# Patient Record
Sex: MALE | Race: WHITE
[De-identification: names, ages, dates, MRNs, and addresses within clinical notes are randomized per-mention and may not be internally consistent; named-entity substitution may affect disease eponyms.]

---

## 2017-04-19 ENCOUNTER — HOSPITAL ENCOUNTER (EMERGENCY)
Dept: HOSPITAL 17 - PHED | Age: 77
Discharge: HOME | End: 2017-04-19
Payer: MEDICARE

## 2017-04-19 VITALS
SYSTOLIC BLOOD PRESSURE: 136 MMHG | HEART RATE: 99 BPM | OXYGEN SATURATION: 98 % | TEMPERATURE: 98.1 F | RESPIRATION RATE: 17 BRPM | DIASTOLIC BLOOD PRESSURE: 90 MMHG

## 2017-04-19 VITALS
SYSTOLIC BLOOD PRESSURE: 132 MMHG | HEART RATE: 69 BPM | RESPIRATION RATE: 18 BRPM | OXYGEN SATURATION: 99 % | DIASTOLIC BLOOD PRESSURE: 70 MMHG

## 2017-04-19 VITALS
RESPIRATION RATE: 16 BRPM | HEART RATE: 80 BPM | DIASTOLIC BLOOD PRESSURE: 80 MMHG | SYSTOLIC BLOOD PRESSURE: 142 MMHG | OXYGEN SATURATION: 95 %

## 2017-04-19 VITALS — HEIGHT: 70.5 IN | WEIGHT: 200.62 LBS | BODY MASS INDEX: 28.4 KG/M2

## 2017-04-19 DIAGNOSIS — E78.00: ICD-10-CM

## 2017-04-19 DIAGNOSIS — Z86.69: ICD-10-CM

## 2017-04-19 DIAGNOSIS — E86.0: ICD-10-CM

## 2017-04-19 DIAGNOSIS — R19.7: Primary | ICD-10-CM

## 2017-04-19 DIAGNOSIS — Z87.19: ICD-10-CM

## 2017-04-19 DIAGNOSIS — I10: ICD-10-CM

## 2017-04-19 DIAGNOSIS — Z85.828: ICD-10-CM

## 2017-04-19 DIAGNOSIS — Z87.39: ICD-10-CM

## 2017-04-19 LAB
ANION GAP SERPL CALC-SCNC: 12 MEQ/L (ref 5–15)
BASOPHILS # BLD AUTO: 0.1 TH/MM3 (ref 0–0.2)
BASOPHILS NFR BLD: 2.2 % (ref 0–2)
BUN SERPL-MCNC: 20 MG/DL (ref 7–18)
C. DIFF EPI 027: (no result)
C. DIFF TOXIN PCR: NEGATIVE
CHLORIDE SERPL-SCNC: 113 MEQ/L (ref 98–107)
EOSINOPHIL # BLD: 0.1 TH/MM3 (ref 0–0.4)
EOSINOPHIL NFR BLD: 1.4 % (ref 0–4)
ERYTHROCYTE [DISTWIDTH] IN BLOOD BY AUTOMATED COUNT: 14.4 % (ref 11.6–17.2)
GFR SERPLBLD BASED ON 1.73 SQ M-ARVRAT: 46 ML/MIN (ref 89–?)
HCO3 BLD-SCNC: 18 MEQ/L (ref 21–32)
HCT VFR BLD CALC: 48.7 % (ref 39–51)
HEMO FLAGS: (no result)
LYMPHOCYTES # BLD AUTO: 0.4 TH/MM3 (ref 1–4.8)
LYMPHOCYTES NFR BLD AUTO: 6.9 % (ref 9–44)
MCH RBC QN AUTO: 28.5 PG (ref 27–34)
MCHC RBC AUTO-ENTMCNC: 32.1 % (ref 32–36)
MCV RBC AUTO: 88.7 FL (ref 80–100)
MONOCYTES NFR BLD: 9.1 % (ref 0–8)
NEUTROPHILS # BLD AUTO: 4.4 TH/MM3 (ref 1.8–7.7)
NEUTROPHILS NFR BLD AUTO: 80.4 % (ref 16–70)
PLATELET # BLD: 109 TH/MM3 (ref 150–450)
POTASSIUM SERPL-SCNC: 4.1 MEQ/L (ref 3.5–5.1)
RBC # BLD AUTO: 5.49 MIL/MM3 (ref 4.5–5.9)
SODIUM SERPL-SCNC: 143 MEQ/L (ref 136–145)
WBC # BLD AUTO: 5.5 TH/MM3 (ref 4–11)

## 2017-04-19 PROCEDURE — 80048 BASIC METABOLIC PNL TOTAL CA: CPT

## 2017-04-19 PROCEDURE — 85025 COMPLETE CBC W/AUTO DIFF WBC: CPT

## 2017-04-19 PROCEDURE — 99284 EMERGENCY DEPT VISIT MOD MDM: CPT

## 2017-04-19 PROCEDURE — 96361 HYDRATE IV INFUSION ADD-ON: CPT

## 2017-04-19 PROCEDURE — 87493 C DIFF AMPLIFIED PROBE: CPT

## 2017-04-19 PROCEDURE — 96360 HYDRATION IV INFUSION INIT: CPT

## 2017-04-19 NOTE — PD
HPI


Chief Complaint:  GI Complaint


Time Seen by Provider:  09:54


Travel History


International Travel<30 days:  No


Contact w/Intl Traveler<30days:  No


Traveled to known affect area:  No





History of Present Illness


HPI


76-year-old male came to the emergency room with history of diarrhea since past 

2 days.  Patient says that last night he was unable to stop going to the 

bathroom.  He was up every 10 minutes.  The stool is runny but no blood.  He 

does not have any other symptoms like abdominal pain or vomiting.  No known 

sick contacts.  No recent antibiotic use or hospitalization.





[facility]


Past Medical History


*** Narrative Medical


List of his past medical, surgical, social and family history was reviewed from 

the nursing note.


Arthritis:  Yes


Cancer:  Yes (MELANOMA FACE)


High Cholesterol:  Yes


Diabetes:  No


Diminished Hearing:  No


GERD:  Yes


Glaucoma:  Yes


Gout:  Yes


Hepatitis:  No


Hiatal Hernia:  Yes (GERD HX)


Hypertension:  Yes


Inguinal Hernia:  Yes


Thyroid Disease:  No





Past Surgical History


Abdominal Surgery:  Yes ([facility])


Cardiac Surgery:  No


Ear Surgery:  No


Endocrine Surgery:  No


Eye Surgery:  No


Genitourinary Surgery:  Yes (RENAL CALC. RETRIEVAL)


Gynecologic Surgery:  No


Oral Surgery:  Yes (T & A, EXC. FB ESOPHAGUS, WISDOM TEETH EXTR.)


Pacemaker:  No


Thoracic Surgery:  No


Other Surgery:  Yes (HERNIA REPAIR)





Social History


Alcohol Use:  Yes (2-3 DRINKS A WEEK)


Tobacco Use:  No


Substance Use:  No





Allergies-Medications


(Allergen,Severity, Reaction):  


Coded Allergies:  


     No Known Allergies (Verified , 4/21/17)


Comments


No known drug allergies.


Reported Meds & Prescriptions





Reported Meds & Active Scripts


Active


Cipro (Ciprofloxacin HCl) 500 Mg Tab 500 Mg PO BID 5 Days


Lomotil (Diphenoxylate-Atropine) 2.5-0.025 Mg Tab 2 Tab PO Q6H PRN


Reported


Aspirin 81 Mg Chew 81 Mg CHEW DAILY


Amlodipine (Amlodipine Besylate) 5 Mg Tab 5 Mg PO DAILY


Lisinopril 20 Mg Tab 20 Mg PO DAILY


Omeprazole 20 Mg Tab 20 Mg PO DAILY


Allopurinol 300 Mg Tab 300 Mg PO DAILY





Narrative Medication


List of his home medications reviewed from the nursing note.





Review of Systems


Except as stated in HPI:  all other systems reviewed are Neg





Physical Exam


Narrative


GENERAL: Awake, alert, no obvious distress


SKIN: Focused skin assessment warm/dry.


HEAD: Atraumatic. Normocephalic. 


EYES: Pupils equal and round. No scleral icterus. No injection or drainage. 


ENT: No nasal bleeding or discharge.  Dry mucous membrane and coated tongue.


NECK: Trachea midline. No JVD. 


CARDIOVASCULAR: Regular rate and rhythm.  No murmur appreciated.


RESPIRATORY: No accessory muscle use. Clear to auscultation. Breath sounds 

equal bilaterally. 


GASTROINTESTINAL: Abdomen soft, non-tender, nondistended. Hepatic and splenic 

margins not palpable. 


MUSCULOSKELETAL: No obvious deformities. No clubbing.  No cyanosis.  No edema. 


NEUROLOGICAL: Awake and alert. No obvious cranial nerve deficits.  Motor 

grossly within normal limits. Normal speech.


PSYCHIATRIC: Appropriate mood and affect; insight and judgment normal.





Data


Data


Last Documented VS





Orders





 Basic Metabolic Panel (Bmp) (4/19/17 09:59)


Complete Blood Count With Diff (4/19/17 09:59)


Iv Access Insert/Monitor (4/19/17 09:59)


Ecg Monitoring (4/19/17 09:59)


Oximetry (4/19/17 09:59)


Sodium Chlor 0.9% 1000 Ml Inj (Ns 1000 M (4/19/17 09:59)


Sodium Chloride 0.9% Flush (Ns Flush) (4/19/17 10:00)


C Diff Toxin Pcr (4/19/17 09:59)


Sodium Chlor 0.9% 1000 Ml Inj (Ns 1000 M (4/19/17 12:00)





Labs








MDM


Medical Decision Making


Medical Screen Exam Complete:  Yes


Emergency Medical Condition:  Yes


Medical Record Reviewed:  Yes


Differential Diagnosis


Diarrhea, dehydration, C. difficile


Narrative Course


10:19 AM awaiting for the blood test results.  Patient was ordered for 1 L of 

IV fluid bolus.  He did have a stool sample and I have ordered for a C. 

difficile.





12:17 PM blood test results of back and suggestive of dehydration.  I ordered a 

second liter of IV fluid which patient is currently getting.  He did not have 

anymore bowel movements since 10:30 but he also did not eat or drink anything.  

I have asked the nurse to give him some gustavo crackers and water.  I was told 

by the lab that the C. difficile will take another 4-5 hours to be resulted.  

At this point I'm comfortable discharging the patient home.  He will not be 

started on loperamide till I have the C. difficile result back.





Procedures


EKG Prior to Arrival:  No





Diagnosis





 Primary Impression:  


 Diarrhea


 Qualified Code:  R19.7 - Diarrhea, unspecified type


 Additional Impression:  


 Dehydration


Referrals:  


Primary Care Physician


2 days





***Additional Instructions:


Please return to the ER if the condition worsens or any other new concerns.  

The stool test result is pending.  We will call you if it is positive.  If it 

is not then he can start herself on Imodium over-the-counter.  Please follow-up 

with your primary care in couple days.


Disposition:  01 DISCHARGE HOME


Condition:  Stable








Benji Sloan MD Apr 19, 2017 09:54


 


Basophils (%) (Auto) 2.2 %


 


Neutrophils # (Auto) 4.4 TH/MM3


 


Lymphocytes # (Auto) 0.4 TH/MM3


 


Monocytes # (Auto) 0.5 TH/MM3


 


Eosinophils # (Auto) 0.1 TH/MM3


 


Basophils # (Auto) 0.1 TH/MM3


 


CBC Comment DIFF FINAL 


 


Differential Comment  


 


Sodium Level 143 MEQ/L


 


Potassium Level 4.1 MEQ/L


 


Chloride Level 113 MEQ/L


 


Carbon Dioxide Level 18.0 MEQ/L


 


Anion Gap 12 MEQ/L


 


Blood Urea Nitrogen 20 MG/DL


 


Creatinine 1.50 MG/DL


 


Estimat Glomerular Filtration 46 ML/MIN





Rate 


 


Random Glucose 107 MG/DL


 


Calcium Level 8.3 MG/DL














MDM


Medical Decision Making


Medical Screen Exam Complete:  Yes


Emergency Medical Condition:  Yes


Medical Record Reviewed:  Yes


Differential Diagnosis


Diarrhea, dehydration, C. difficile


Narrative Course


10:19 AM awaiting for the blood test results.  Patient was ordered for 1 L of 

IV fluid bolus.  He did have a stool sample and I have ordered for a C. 

difficile.





12:17 PM blood test results of back and suggestive of dehydration.  I ordered a 

second liter of IV fluid which patient is currently getting.  He did not have 

anymore bowel movements since 10:30 but he also did not eat or drink anything.  

I have asked the nurse to give him some gustavo crackers and water.  I was told 

by the lab that the C. difficile will take another 4-5 hours to be resulted.  

At this point I'm comfortable discharging the patient home.  He will not be 

started on loperamide till I have the C. difficile result back.





Procedures


EKG Prior to Arrival:  No





Diagnosis





 Primary Impression:  


 Diarrhea


 Qualified Code:  R19.7 - Diarrhea, unspecified type


 Additional Impression:  


 Dehydration


Referrals:  


Primary Care Physician


2 days





***Additional Instructions:


Please return to the ER if the condition worsens or any other new concerns.  

The stool test result is pending.  We will call you if it is positive.  If it 

is not then he can start herself on Imodium over-the-counter.  Please follow-up 

with your primary care in couple days.


Disposition:  01 DISCHARGE HOME


Condition:  Stable








Benji Sloan MD Apr 19, 2017 09:54

## 2017-04-21 ENCOUNTER — HOSPITAL ENCOUNTER (EMERGENCY)
Dept: HOSPITAL 17 - PHED | Age: 77
Discharge: HOME | End: 2017-04-21
Payer: MEDICARE

## 2017-04-21 VITALS
DIASTOLIC BLOOD PRESSURE: 69 MMHG | SYSTOLIC BLOOD PRESSURE: 118 MMHG | RESPIRATION RATE: 14 BRPM | OXYGEN SATURATION: 96 % | HEART RATE: 67 BPM

## 2017-04-21 VITALS
OXYGEN SATURATION: 96 % | DIASTOLIC BLOOD PRESSURE: 70 MMHG | HEART RATE: 79 BPM | SYSTOLIC BLOOD PRESSURE: 126 MMHG | RESPIRATION RATE: 16 BRPM | TEMPERATURE: 97.7 F

## 2017-04-21 VITALS — BODY MASS INDEX: 28.09 KG/M2 | HEIGHT: 71 IN | WEIGHT: 200.62 LBS

## 2017-04-21 DIAGNOSIS — Z87.39: ICD-10-CM

## 2017-04-21 DIAGNOSIS — R19.7: Primary | ICD-10-CM

## 2017-04-21 DIAGNOSIS — I10: ICD-10-CM

## 2017-04-21 LAB
ALP SERPL-CCNC: 66 U/L (ref 45–117)
ALT SERPL-CCNC: 20 U/L (ref 12–78)
ANION GAP SERPL CALC-SCNC: 11 MEQ/L (ref 5–15)
AST SERPL-CCNC: 24 U/L (ref 15–37)
BASOPHILS # BLD AUTO: 0 TH/MM3 (ref 0–0.2)
BASOPHILS NFR BLD: 0.5 % (ref 0–2)
BILIRUB SERPL-MCNC: 0.6 MG/DL (ref 0.2–1)
BUN SERPL-MCNC: 19 MG/DL (ref 7–18)
CHLORIDE SERPL-SCNC: 113 MEQ/L (ref 98–107)
EOSINOPHIL # BLD: 0.1 TH/MM3 (ref 0–0.4)
EOSINOPHIL NFR BLD: 1.6 % (ref 0–4)
ERYTHROCYTE [DISTWIDTH] IN BLOOD BY AUTOMATED COUNT: 13.9 % (ref 11.6–17.2)
GFR SERPLBLD BASED ON 1.73 SQ M-ARVRAT: 50 ML/MIN (ref 89–?)
HCO3 BLD-SCNC: 17.4 MEQ/L (ref 21–32)
HCT VFR BLD CALC: 47.2 % (ref 39–51)
HEMO FLAGS: (no result)
LYMPHOCYTES # BLD AUTO: 0.6 TH/MM3 (ref 1–4.8)
LYMPHOCYTES NFR BLD AUTO: 11.7 % (ref 9–44)
MCH RBC QN AUTO: 29 PG (ref 27–34)
MCHC RBC AUTO-ENTMCNC: 32.5 % (ref 32–36)
MCV RBC AUTO: 89 FL (ref 80–100)
MONOCYTES NFR BLD: 9.8 % (ref 0–8)
NEUTROPHILS # BLD AUTO: 4.2 TH/MM3 (ref 1.8–7.7)
NEUTROPHILS NFR BLD AUTO: 76.4 % (ref 16–70)
PLATELET # BLD: 125 TH/MM3 (ref 150–450)
POTASSIUM SERPL-SCNC: 4.2 MEQ/L (ref 3.5–5.1)
RBC # BLD AUTO: 5.3 MIL/MM3 (ref 4.5–5.9)
SODIUM SERPL-SCNC: 141 MEQ/L (ref 136–145)
WBC # BLD AUTO: 5.4 TH/MM3 (ref 4–11)

## 2017-04-21 PROCEDURE — 99284 EMERGENCY DEPT VISIT MOD MDM: CPT

## 2017-04-21 PROCEDURE — 85025 COMPLETE CBC W/AUTO DIFF WBC: CPT

## 2017-04-21 PROCEDURE — 80053 COMPREHEN METABOLIC PANEL: CPT

## 2017-04-21 PROCEDURE — 96360 HYDRATION IV INFUSION INIT: CPT

## 2017-04-21 NOTE — PD
HPI


Chief Complaint:  GI Complaint


Time Seen by Provider:  09:50


Travel History


International Travel<30 days:  No


Contact w/Intl Traveler<30days:  No


Traveled to known affect area:  No





History of Present Illness


HPI


This 76-year-old man who presents to the emergency department complaining of 

diarrhea.  He states he's been having loose watery stools, every hour or so, 

for the past 5 days.  He was on multiple times through the night with watery 

diarrhea.  No sick contacts, no recent travel, no unusual foods.  He's never 

had similar trouble before.  No blood in his stool.  Maybe a little bit immune 

this.  He was seen here a couple days ago and had a negative C. difficile and 

some labs show just some dehydration.  He has been taking loperamide but 

without significant relief.  No fevers.  No vomiting.  He drinks city water.  He

's got 18 times or so in the past 24 hours.





History


Past Medical History


*** Narrative Medical


Hypertension


Gout


Tetanus Vaccination:  > 5 Years


Influenza Vaccination:  Yes





Social History


Alcohol Use:  Yes (2-3 DRINKS A WEEK)


Tobacco Use:  No





Allergies-Medications


(Allergen,Severity, Reaction):  


Coded Allergies:  


     No Known Allergies (Verified , 4/21/17)


Reported Meds & Prescriptions





Reported Meds & Active Scripts


Active


Reported


Aspirin 81 Mg Chew 81 Mg CHEW DAILY


Amlodipine (Amlodipine Besylate) 5 Mg Tab 5 Mg PO DAILY


Lisinopril 20 Mg Tab 20 Mg PO DAILY


Omeprazole 20 Mg Tab 20 Mg PO DAILY


Allopurinol 300 Mg Tab 300 Mg PO DAILY








Review of Systems


Except as stated in HPI:  all other systems reviewed are Neg





Physical Exam


Narrative


GENERAL: Well-appearing 76 old man, no acute distress.


SKIN: Focused skin assessment warm/dry.


HEAD: Atraumatic. Normocephalic. 


EYES: Pupils equal and round. No scleral icterus. No injection or drainage. 


ENT: No nasal bleeding or discharge.  Mucous membranes pink and moist.


NECK: Trachea midline. No JVD. 


CARDIOVASCULAR: Regular rate and rhythm.  No murmur appreciated.


RESPIRATORY: No accessory muscle use. Clear to auscultation. Breath sounds 

equal bilaterally. 


GASTROINTESTINAL: Abdomen soft, non-tender, nondistended. Hepatic and splenic 

margins not palpable. 


MUSCULOSKELETAL: No obvious deformities. No clubbing.  No cyanosis.  No edema. 


NEUROLOGICAL: Awake and alert. No obvious cranial nerve deficits.  Motor 

grossly within normal limits. Normal speech.


PSYCHIATRIC: Appropriate mood and affect; insight and judgment normal.





Data


Data


Last Documented VS





Vital Signs








  Date Time  Temp Pulse Resp B/P Pulse Ox O2 Delivery O2 Flow Rate FiO2


 


4/21/17 09:22 97.7 79 16 126/70 96   








Orders





 Complete Blood Count With Diff (4/21/17 10:05)


Comprehensive Metabolic Panel (4/21/17 10:05)


Iv Access Insert/Monitor (4/21/17 10:05)


Enteric Path (Stool) (4/21/17 10:05)


Stool Ova And Parasite Screen (4/21/17 10:05)


Diphenoxylate/Atropine Tab (Lomotil Tab) (4/21/17 10:15)


Sodium Chlor 0.9% 1000 Ml Inj (Ns 1000 M (4/21/17 10:15)


Ciprofloxacin (Cipro) (4/21/17 10:15)





Labs





 Laboratory Tests








Test 4/21/17





 10:15


 


White Blood Count 5.4 TH/MM3


 


Red Blood Count 5.30 MIL/MM3


 


Hemoglobin 15.4 GM/DL


 


Hematocrit 47.2 %


 


Mean Corpuscular Volume 89.0 FL


 


Mean Corpuscular Hemoglobin 29.0 PG


 


Mean Corpuscular Hemoglobin 32.5 %





Concent 


 


Red Cell Distribution Width 13.9 %


 


Platelet Count 125 TH/MM3


 


Mean Platelet Volume 9.5 FL


 


Neutrophils (%) (Auto) 76.4 %


 


Lymphocytes (%) (Auto) 11.7 %


 


Monocytes (%) (Auto) 9.8 %


 


Eosinophils (%) (Auto) 1.6 %


 


Basophils (%) (Auto) 0.5 %


 


Neutrophils # (Auto) 4.2 TH/MM3


 


Lymphocytes # (Auto) 0.6 TH/MM3


 


Monocytes # (Auto) 0.5 TH/MM3


 


Eosinophils # (Auto) 0.1 TH/MM3


 


Basophils # (Auto) 0.0 TH/MM3


 


CBC Comment DIFF FINAL 


 


Differential Comment  


 


Sodium Level 141 MEQ/L


 


Potassium Level 4.2 MEQ/L


 


Chloride Level 113 MEQ/L


 


Carbon Dioxide Level 17.4 MEQ/L


 


Anion Gap 11 MEQ/L


 


Blood Urea Nitrogen 19 MG/DL


 


Creatinine 1.40 MG/DL


 


Estimat Glomerular Filtration 50 ML/MIN





Rate 


 


Random Glucose 104 MG/DL


 


Calcium Level 8.5 MG/DL


 


Total Bilirubin 0.6 MG/DL


 


Aspartate Amino Transf 24 U/L





(AST/SGOT) 


 


Alanine Aminotransferase 20 U/L





(ALT/SGPT) 


 


Alkaline Phosphatase 66 U/L


 


Total Protein 7.3 GM/DL


 


Albumin 3.7 GM/DL











[facility]


Medical Decision Making


Medical Screen Exam Complete:  Yes


Emergency Medical Condition:  Yes


Interpretation(s)


LABS:


CBC remarkable for platelet count 125


CMP still showing hyperchloremic acidosis.


Differential Diagnosis


Infectious enteritis, colitis, diverticulitis, parasitic infection, other


Narrative Course


Medical decision making





76 old man with watery diarrhea ongoing for about 5 days now.  We'll send stool 

culture, stool O and P.  We'll also switch to Lomotil, treated with 

antibiotics.  Recommend GI follow-up.





Diagnosis





 Primary Impression:  


 Diarrhea





***Additional Instructions:


Take Lomotil as prescribed.





Take Cipro as prescribed.





Drink plenty of fluids to stay well-hydrated.





Follow-up with your gastroenterologist at the first available appointment.





Return to the emergency department for bloody stool, high fevers, abdominal pain

, or any other new or worsening symptoms.


***Med/Other Pt SpecificInfo:  Prescription(s) given


Scripts


Ciprofloxacin (Cipro)500 Mg Qtt376 Mg PO BID  5 Days  Ref 0


   Prov:Garret Mitchell MD         4/21/17 


Diphenoxylate-Atropine (Lomotil)2.5-0.025 Mg Tab2 Tab PO Q6H PRN (DIARRHEA) #30 

TAB  Ref 0


   Prov:Garret Mitchell MD         4/21/17


Disposition:  01 DISCHARGE HOME


Condition:  Stable








Garret Mitchell MD Apr 21, 2017 11:18

## 2017-05-18 ENCOUNTER — HOSPITAL ENCOUNTER (EMERGENCY)
Dept: HOSPITAL 17 - PHEFT | Age: 77
Discharge: HOME | End: 2017-05-18
Payer: MEDICARE

## 2017-05-18 VITALS — HEIGHT: 71 IN | BODY MASS INDEX: 27.65 KG/M2 | WEIGHT: 197.53 LBS

## 2017-05-18 VITALS
HEART RATE: 91 BPM | OXYGEN SATURATION: 98 % | SYSTOLIC BLOOD PRESSURE: 155 MMHG | TEMPERATURE: 98.5 F | DIASTOLIC BLOOD PRESSURE: 85 MMHG | RESPIRATION RATE: 16 BRPM
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
## 2017-05-18 DIAGNOSIS — S61.012A: Primary | ICD-10-CM

## 2017-05-18 DIAGNOSIS — I10: ICD-10-CM

## 2017-05-18 PROCEDURE — 12001 RPR S/N/AX/GEN/TRNK 2.5CM/<: CPT

## 2017-05-18 NOTE — PD
HPI


Chief Complaint:  Laceration/Skin Injury


Time Seen by Provider:  12:42


Travel History


International Travel<30 days:  No


Contact w/Intl Traveler<30days:  No


Traveled to known affect area:  No





History of Present Illness


HPI


76-year-old male presents to the emergency room for evaluation of a laceration 

to his left thumb.  Patient was using a table saw and got his thumb too close 

to the blade.  He immediately applied pressure and came to the emergency room 

without washing it.  He takes aspirin but no other blood thinners.  Denies loss 

of sensation or significant pain.  Last tetanus was less than 10 years ago.  No 

history of diabetes.





PFSH


Past Medical History


Hx Anticoagulant Therapy:  Yes (BABY ASA DAILY)


Arthritis:  Yes


Cancer:  Yes (MELANOMA FACE)


Cardiovascular Problems:  Yes (htn on meds)


High Cholesterol:  Yes


Diabetes:  No


Diminished Hearing:  No


GERD:  Yes


Glaucoma:  Yes


Gout:  Yes


Hepatitis:  No


Hiatal Hernia:  Yes (GERD HX)


Hypertension:  Yes


Inguinal Hernia:  Yes


Thyroid Disease:  No





Past Surgical History


Abdominal Surgery:  Yes ([facility])


Cardiac Surgery:  No


Ear Surgery:  No


Endocrine Surgery:  No


Eye Surgery:  No


Genitourinary Surgery:  Yes (RENAL CALC. RETRIEVAL)


Gynecologic Surgery:  No


Oral Surgery:  Yes (T & A, EXC. FB ESOPHAGUS, WISDOM TEETH EXTR.)


Pacemaker:  No


Thoracic Surgery:  No


Other Surgery:  Yes (HERNIA REPAIR)





Social History


Alcohol Use:  Yes (2-3 DRINKS A WEEK)


Tobacco Use:  No


Substance Use:  No





Allergies-Medications


(Allergen,Severity, Reaction):  


Coded Allergies:  


     No Known Allergies (Verified , 5/18/17)


Reported Meds & Prescriptions





Reported Meds & Active Scripts


Active


Reported


Aspirin 81 Mg Chew 81 Mg CHEW DAILY


Amlodipine (Amlodipine Besylate) 5 Mg Tab 5 Mg PO DAILY


Lisinopril 20 Mg Tab 20 Mg PO DAILY


Omeprazole 20 Mg Tab 20 Mg PO DAILY


Allopurinol 300 Mg Tab 300 Mg PO DAILY








Review of Systems


Except as stated in HPI:  all other systems reviewed are Neg





Physical Exam


Narrative


GENERAL: Well-nourished, well-developed elderly male in no acute distress.  

Afebrile.  Ambulatory.


SKIN: Focused skin assessment warm/dry.  There is a 2 cm rigid, deep laceration 

of the left distal thumb.  No nail involvement.  No obvious foreign bodies.  

None bleeding.


HEAD: Normocephalic.


EYES: No scleral icterus. No injection or drainage. 


NECK: Supple, trachea midline. No JVD or lymphadenopathy.


CARDIOVASCULAR: Regular rate and rhythm without murmurs, gallops, or rubs. 


RESPIRATORY: Breath sounds equal bilaterally. No accessory muscle use.


EXTREMITY: No significant tenderness to palpation.  Less than 2 second 

capillary refill distally.  Distal sensation intact.  Full range motion of the 

left thumb.





Data


Data


Last Documented VS





Vital Signs








  Date Time  Temp Pulse Resp B/P Pulse Ox O2 Delivery O2 Flow Rate FiO2


 


5/18/17 12:41   16     


 


5/18/17 12:37 98.5 91  155/85 98   








Orders





 Bupivacaine Pf 0.5% Inj (Marcaine Pf 0.5 (5/18/17 12:45)


Lidocaine 1% Inj (50 Ml) (Xylocaine 1% I (5/18/17 12:45)








MDM


Medical Decision Making


Medical Screen Exam Complete:  Yes


Emergency Medical Condition:  Yes


Medical Record Reviewed:  Yes


Differential Diagnosis


Laceration versus abrasion versus contusion versus fracture


Narrative Course


76-year-old male presents to the emergency room for evaluation of left distal 

thumb laceration that occurred just prior to arrival after patient cut his 

finger on a table fall.   Physical exam reveals a 2 cm rigid laceration to the 

distal thumb without nail involvement.  Sensation intact with less than 2 

second capillary refill distally.  The laceration was repaired, see procedure 

note for details.  Patient discharged with prescription for Keflex and wound 

care instructions.  Told to follow up with primary care physician or return for 

worsening symptoms.  He understands and agrees to plan.





Procedures


**Procedure Narrative**


LACERATION


LOCATION: Left distal thumb


LENGTH: 2 cm


NUMBER OF STITCHES/STAPLES: 7 simple interrupted





REPAIR: The area of the laceration was prepped with Betadine and sterilely 

draped.  Digital block performed using 1% lidocaine and 0.5% bupivacaine.  The 

wound was copiously irrigated and explored without evidence of foreign body, 

tendon injury or neurovascular injury.  The wound was closed using 5-0 Prolene. 

This was a single layer repair. A sterile dressing was applied. The patient was 

advised to keep the dressing clean and dry. Patient tolerated the procedure 

well.





Diagnosis





 Primary Impression:  


 Laceration of left thumb


 Qualified Code:  S61.012A - Laceration of left thumb, initial encounter


Referrals:  


Pediatrician


Patient Instructions:  Finger Laceration (ED), General Instructions





***Additional Instructions:


Rest and drink plenty of fluids.


Keflex as directed, until gone.


Take Tylenol as directed, as needed for pain.


Keep wound clean and dry.  Apply triple antibiotic ointment daily.  Return in 7-

10 days to have sutures removed.


Follow-up with a primary care physician.


Return to the emergency room for worsening symptoms.


***Med/Other Pt SpecificInfo:  Wound Care


Disposition:  01 DISCHARGE HOME


Condition:  Stable








Tyra Moses May 18, 2017 12:48

## 2018-03-18 ENCOUNTER — HOSPITAL ENCOUNTER (EMERGENCY)
Dept: HOSPITAL 17 - PHED | Age: 78
Discharge: HOME | End: 2018-03-18
Payer: MEDICARE

## 2018-03-18 VITALS
RESPIRATION RATE: 16 BRPM | DIASTOLIC BLOOD PRESSURE: 99 MMHG | TEMPERATURE: 98.5 F | HEART RATE: 134 BPM | SYSTOLIC BLOOD PRESSURE: 197 MMHG | OXYGEN SATURATION: 97 %

## 2018-03-18 VITALS
OXYGEN SATURATION: 96 % | HEART RATE: 77 BPM | RESPIRATION RATE: 18 BRPM | DIASTOLIC BLOOD PRESSURE: 65 MMHG | SYSTOLIC BLOOD PRESSURE: 122 MMHG

## 2018-03-18 VITALS
RESPIRATION RATE: 18 BRPM | HEART RATE: 88 BPM | SYSTOLIC BLOOD PRESSURE: 153 MMHG | OXYGEN SATURATION: 96 % | DIASTOLIC BLOOD PRESSURE: 67 MMHG

## 2018-03-18 VITALS — HEART RATE: 128 BPM | SYSTOLIC BLOOD PRESSURE: 156 MMHG | DIASTOLIC BLOOD PRESSURE: 94 MMHG

## 2018-03-18 VITALS
DIASTOLIC BLOOD PRESSURE: 94 MMHG | SYSTOLIC BLOOD PRESSURE: 157 MMHG | HEART RATE: 131 BPM | RESPIRATION RATE: 18 BRPM | OXYGEN SATURATION: 96 %

## 2018-03-18 VITALS
SYSTOLIC BLOOD PRESSURE: 122 MMHG | HEART RATE: 78 BPM | OXYGEN SATURATION: 96 % | RESPIRATION RATE: 18 BRPM | DIASTOLIC BLOOD PRESSURE: 61 MMHG

## 2018-03-18 VITALS — BODY MASS INDEX: 29.96 KG/M2 | HEIGHT: 70.5 IN | WEIGHT: 211.64 LBS

## 2018-03-18 VITALS — OXYGEN SATURATION: 96 %

## 2018-03-18 DIAGNOSIS — I48.92: Primary | ICD-10-CM

## 2018-03-18 DIAGNOSIS — Z79.82: ICD-10-CM

## 2018-03-18 DIAGNOSIS — R94.31: ICD-10-CM

## 2018-03-18 DIAGNOSIS — R61: ICD-10-CM

## 2018-03-18 DIAGNOSIS — I10: ICD-10-CM

## 2018-03-18 LAB
BASOPHILS # BLD AUTO: 0.1 TH/MM3 (ref 0–0.2)
BASOPHILS NFR BLD: 2.1 % (ref 0–2)
BUN SERPL-MCNC: 16 MG/DL (ref 7–18)
CALCIUM SERPL-MCNC: 8.3 MG/DL (ref 8.5–10.1)
CHLORIDE SERPL-SCNC: 112 MEQ/L (ref 98–107)
CREAT SERPL-MCNC: 1.4 MG/DL (ref 0.6–1.3)
EOSINOPHIL # BLD: 0.4 TH/MM3 (ref 0–0.4)
EOSINOPHIL NFR BLD: 6.4 % (ref 0–4)
ERYTHROCYTE [DISTWIDTH] IN BLOOD BY AUTOMATED COUNT: 14.2 % (ref 11.6–17.2)
GFR SERPLBLD BASED ON 1.73 SQ M-ARVRAT: 49 ML/MIN (ref 89–?)
GLUCOSE SERPL-MCNC: 146 MG/DL (ref 74–106)
HCO3 BLD-SCNC: 23.7 MEQ/L (ref 21–32)
HCT VFR BLD CALC: 45.8 % (ref 39–51)
HGB BLD-MCNC: 15.9 GM/DL (ref 13–17)
INR PPP: 1 RATIO
LYMPHOCYTES # BLD AUTO: 1 TH/MM3 (ref 1–4.8)
LYMPHOCYTES NFR BLD AUTO: 18.3 % (ref 9–44)
MAGNESIUM SERPL-MCNC: 2 MG/DL (ref 1.5–2.5)
MCH RBC QN AUTO: 30.7 PG (ref 27–34)
MCHC RBC AUTO-ENTMCNC: 34.8 % (ref 32–36)
MCV RBC AUTO: 88.3 FL (ref 80–100)
MONOCYTE #: 0.4 TH/MM3 (ref 0–0.9)
MONOCYTES NFR BLD: 7.4 % (ref 0–8)
NEUTROPHILS # BLD AUTO: 3.8 TH/MM3 (ref 1.8–7.7)
NEUTROPHILS NFR BLD AUTO: 65.8 % (ref 16–70)
PLATELET # BLD: 143 TH/MM3 (ref 150–450)
PMV BLD AUTO: 10.9 FL (ref 7–11)
PROTHROMBIN TIME: 9.7 SEC (ref 9.8–11.6)
RBC # BLD AUTO: 5.19 MIL/MM3 (ref 4.5–5.9)
SODIUM SERPL-SCNC: 142 MEQ/L (ref 136–145)
TROPONIN I SERPL-MCNC: (no result) NG/ML (ref 0.02–0.05)
WBC # BLD AUTO: 5.7 TH/MM3 (ref 4–11)

## 2018-03-18 PROCEDURE — 71046 X-RAY EXAM CHEST 2 VIEWS: CPT

## 2018-03-18 PROCEDURE — 96376 TX/PRO/DX INJ SAME DRUG ADON: CPT

## 2018-03-18 PROCEDURE — 93005 ELECTROCARDIOGRAM TRACING: CPT

## 2018-03-18 PROCEDURE — 83880 ASSAY OF NATRIURETIC PEPTIDE: CPT

## 2018-03-18 PROCEDURE — 84484 ASSAY OF TROPONIN QUANT: CPT

## 2018-03-18 PROCEDURE — 84443 ASSAY THYROID STIM HORMONE: CPT

## 2018-03-18 PROCEDURE — 83735 ASSAY OF MAGNESIUM: CPT

## 2018-03-18 PROCEDURE — 85610 PROTHROMBIN TIME: CPT

## 2018-03-18 PROCEDURE — 96365 THER/PROPH/DIAG IV INF INIT: CPT

## 2018-03-18 PROCEDURE — 96375 TX/PRO/DX INJ NEW DRUG ADDON: CPT

## 2018-03-18 PROCEDURE — 85025 COMPLETE CBC W/AUTO DIFF WBC: CPT

## 2018-03-18 PROCEDURE — 85730 THROMBOPLASTIN TIME PARTIAL: CPT

## 2018-03-18 PROCEDURE — 82550 ASSAY OF CK (CPK): CPT

## 2018-03-18 PROCEDURE — 80048 BASIC METABOLIC PNL TOTAL CA: CPT

## 2018-03-18 NOTE — PD
HPI


Chief Complaint:  Cardiac Complaint


Time Seen by Provider:  19:20


Travel History


International Travel<30 days:  No


Contact w/Intl Traveler<30days:  No


Traveled to known affect area:  No





History of Present Illness


HPI


77-year-old male presents to the emergency department by private transportation 

the care of her spouse for evaluation of elevated blood pressure and sensation 

of rapid heart rate.  Patient states he has had elevated blood pressure over 

the past 5 days.  Patient does have a history of hypertension and takes 

amlodipine daily.  Patient denies any previous history of rate related issues.  

Patient denies any chest pain or shortness of breath.  Patient is noted mild 

diaphoresis but denies any nausea vomiting.  Patient denies any referred 

discomfort to the neck jaw back shoulder arms or abdomen.  Patient had no near 

syncope or syncope.  Patient denies any recent long distance travel protracted 

bedrest or surgical procedure.  Patient was encouraged by his primary care 

provider whom he saw on Wednesday to monitor his blood pressure and they would 

address adjusting his blood pressure medication at follow-up visit if 

necessary.  Patient rates discomfort 0/10 intensity.





PFSH


Past Medical History


*** Narrative Medical


Hypertension, dyslipidemia, gouty arthritis, melanoma, esophageal foreign body 

removal, inguinal herniorrhaphy; occasional alcohol use; nursing notes reviewed


Hx Anticoagulant Therapy:  Yes (asa 81mg)


Arthritis:  Yes


Cancer:  Yes (MELANOMA FACE)


Cardiovascular Problems:  Yes (htn on meds)


High Cholesterol:  Yes


Diabetes:  No


Diminished Hearing:  No


GERD:  Yes


Glaucoma:  Yes


Gout:  Yes


Hepatitis:  No


Hiatal Hernia:  Yes (GERD HX)


Hypertension:  Yes


Inguinal Hernia:  Yes


Medical other:  No


Respiratory:  No


Thyroid Disease:  No


Tetanus Vaccination:  > 5 Years


Influenza Vaccination:  Yes





Past Surgical History


Abdominal Surgery:  Yes ([facility])


Cardiac Surgery:  No


Ear Surgery:  No


Endocrine Surgery:  No


Eye Surgery:  No


Genitourinary Surgery:  Yes (RENAL CALC. RETRIEVAL)


Gynecologic Surgery:  No


Neurologic Surgery:  No


Oral Surgery:  Yes (T & A, EXC. FB ESOPHAGUS, WISDOM TEETH EXTR.)


Pacemaker:  No


Thoracic Surgery:  No


Other Surgery:  Yes (HERNIA REPAIR- right groin, left side of face melanoma 

removal)





Social History


Alcohol Use:  Yes (socially  mix drinks, beer or wine)


Tobacco Use:  No


Substance Use:  No





Allergies-Medications


(Allergen,Severity, Reaction):  


Coded Allergies:  


     No Known Allergies (Verified  Adverse Reaction, Unknown, 3/18/18)


Reported Meds & Prescriptions





Reported Meds & Active Scripts


Active


Reported


Aspirin 81 Mg Chew 81 Mg CHEW DAILY


Amlodipine (Amlodipine Besylate) 5 Mg Tab 5 Mg PO DAILY


Lisinopril 20 Mg Tab 20 Mg PO DAILY


Omeprazole 20 Mg Tab 20 Mg PO DAILY


Allopurinol 300 Mg Tab 300 Mg PO DAILY





Review of Systems


Except as stated in HPI:  all other systems reviewed are Neg


General / Constitutional:  No: Fever, Chills


HENT:  No: Congestion


Cardiovascular:  Positive: Palpitations, Tachycardia, Diaphoresis, No: Chest 

Pain or Discomfort


Respiratory:  No: Shortness of Breath


Gastrointestinal:  No: Nausea, Vomiting, Abdominal Pain


Genitourinary:  No: Dysuria, Flank Pain


Musculoskeletal:  No: Myalgias, Arthralgias


Skin:  No Rash


Neurologic:  No: Weakness, Dizziness, Syncope


Psychiatric:  No: Anxiety


Hematologic/Lymphatic:  No: Lymph Node Enlargement





Physical Exam


Narrative


GENERAL: Well-developed well-nourished male in no acute distress no respiratory 

distress


SKIN: Warm and dry.


HEAD: Normocephalic.


EYES: No scleral icterus. No injection or drainage. 


NECK: Supple, trachea midline. No JVD or lymphadenopathy.


CARDIOVASCULAR: Increased regular rate and rhythm without murmurs, gallops, or 

rubs. 


RESPIRATORY: Breath sounds equal bilaterally. No accessory muscle use.


GASTROINTESTINAL: Abdomen soft, non-tender, nondistended. 


MUSCULOSKELETAL: No cyanosis, or edema. 


BACK: Nontender without obvious deformity. No CVA tenderness.








Data


Data


Last Documented VS





Vital Signs








  Date Time  Temp Pulse Resp B/P (MAP) Pulse Ox O2 Delivery O2 Flow Rate FiO2


 


3/18/18 20:38  77  141/69    


 


3/18/18 20:06   18  96 Room Air  


 


3/18/18 18:53 98.5       








Orders





 Orders


Electrocardiogram (3/18/18 19:20)


Basic Metabolic Panel (Bmp) (3/18/18 19:20)


B-Type Natriuretic Peptide (3/18/18 19:20)


Ckmb (Isoenzyme) Profile (3/18/18 19:20)


Complete Blood Count With Diff (3/18/18 19:20)


Magnesium (Mg) (3/18/18 19:20)


Prothrombin Time / Inr (Pt) (3/18/18 19:20)


Act Partial Throm Time (Ptt) (3/18/18 19:20)


Troponin I (3/18/18 19:20)


Ecg Monitoring (3/18/18 19:20)


Bilateral Bp Monitoring (3/18/18 19:20)


Iv Access Insert/Monitor (3/18/18 19:20)


Oximetry (3/18/18 19:20)


Oxygen Administration (3/18/18 19:20)


Aspirin Chew (Aspirin Chew) (3/18/18 19:30)


Sodium Chloride 0.9% Flush (Ns Flush) (3/18/18 19:30)


Thyroid Stimulating Hormone (3/18/18 19:20)


Diltiazem Inj (Cardizem Inj) (3/18/18 19:30)


Diltiazem Inj (Cardizem Inj) (3/18/18 20:00)


Chest, Pa & Lat (3/18/18 )


Diltiazem Inj (Cardizem Inj) (3/18/18 20:15)


Sodium Chloride 0.9% Flush (Ns Flush) (3/18/18 20:15)


Electrocardiogram (3/18/18 )





Labs





Laboratory Tests








Test


  3/18/18


19:25


 


White Blood Count 5.7 TH/MM3 


 


Red Blood Count 5.19 MIL/MM3 


 


Hemoglobin 15.9 GM/DL 


 


Hematocrit 45.8 % 


 


Mean Corpuscular Volume 88.3 FL 


 


Mean Corpuscular Hemoglobin 30.7 PG 


 


Mean Corpuscular Hemoglobin


Concent 34.8 % 


 


 


Red Cell Distribution Width 14.2 % 


 


Platelet Count 143 TH/MM3 


 


Mean Platelet Volume 10.9 FL 


 


Neutrophils (%) (Auto) 65.8 % 


 


Lymphocytes (%) (Auto) 18.3 % 


 


Monocytes (%) (Auto) 7.4 % 


 


Eosinophils (%) (Auto) 6.4 % 


 


Basophils (%) (Auto) 2.1 % 


 


Neutrophils # (Auto) 3.8 TH/MM3 


 


Lymphocytes # (Auto) 1.0 TH/MM3 


 


Monocytes # (Auto) 0.4 TH/MM3 


 


Eosinophils # (Auto) 0.4 TH/MM3 


 


Basophils # (Auto) 0.1 TH/MM3 


 


CBC Comment DIFF FINAL 


 


Differential Comment  


 


Prothrombin Time 9.7 SEC 


 


Prothromb Time International


Ratio 1.0 RATIO 


 


 


Activated Partial


Thromboplast Time 26.2 SEC 


 


 


Blood Urea Nitrogen 16 MG/DL 


 


Creatinine 1.40 MG/DL 


 


Random Glucose 146 MG/DL 


 


Calcium Level 8.3 MG/DL 


 


Magnesium Level 2.0 MG/DL 


 


Sodium Level 142 MEQ/L 


 


Potassium Level 3.7 MEQ/L 


 


Chloride Level 112 MEQ/L 


 


Carbon Dioxide Level 23.7 MEQ/L 


 


Anion Gap 6 MEQ/L 


 


Estimat Glomerular Filtration


Rate 49 ML/MIN 


 


 


Total Creatine Kinase 67 U/L 


 


Troponin I


  LESS THAN 0.02


NG/ML


 


B-Type Natriuretic Peptide 76 PG/ML 


 


Thyroid Stimulating Hormone


3rd Gen 1.870 uIU/ML 


 











MDM


Medical Decision Making


Medical Screen Exam Complete:  Yes


Emergency Medical Condition:  Yes


Medical Record Reviewed:  Yes


Interpretation(s)


EKG atrial flutter with rapid ventricular rate of 130 no acute ST elevation or 

injury pattern


Differential Diagnosis


Arrhythmia, SVT, atrial flutter with rapid ventricular rate, metabolic 

disturbance, ACS, MI, thyroid dysfunction


Narrative Course


Patient placed on cardiac monitor with continuous pulse oximetry EKG performed 

shows atrial flutter with 2-1 block no acute ST elevation or injury pattern; 

vagal maneuvers attempted without success; patient administered Cardizem will


CBC is automated differential values in normal range.


Patient given additional bolus of Cardizem 30 mg IV and converted to sinus 

rhythm with first-degree AV block rate 80 at 20:10 PM


Lab values found to be in normal range except for renal insufficiency; cardiac 

enzymes values are within normal range; TSH values in normal range.


Patient is stable at this time for outpatient management with change of 

amlodipine to Cardizem with recommendation for 1 day follow-up with primary 

care provider.





Diagnosis





 Primary Impression:  


 Paroxysmal atrial flutter


Referrals:  


Primary Care Physician


1 day


Patient Instructions:  General Instructions





***Additional Instructions:  


Follow-up with primary care provider 1 day


Stop amlodipine and begin Cardizem


Return to the emergency department for any concerns or change in condition


Monitor blood pressure twice daily


Increase fluid hydration


***Med/Other Pt SpecificInfo:  Prescription(s) given, Med Stopped (norvasc/

amlodipine)


Scripts


Diltiazem CD 24 HR (Cardizem CD 24 HR) 120 Mg Caper


120 MG PO DAILY, #30 CAP 0 Refills


   Prov: Ivy Harden MD         3/18/18


Disposition:  01 DISCHARGE HOME


Condition:  Stable











Ivy Harden MD Mar 18, 2018 20:06

## 2018-03-18 NOTE — RADRPT
EXAM DATE/TIME:  03/18/2018 19:42 

 

[facility] COMPARISON:     

No previous studies available for comparison.

 

                     

INDICATIONS :     

Patient feels like heart is racing.

                     

 

MEDICAL HISTORY :     

Hypertension.          

 

SURGICAL HISTORY :     

None.   

 

ENCOUNTER:     

Initial                                        

 

ACUITY:     

1 day      

 

PAIN SCORE:     

0/10

 

LOCATION:     

Bilateral chest 

 

FINDINGS:     

PA and lateral views of the chest demonstrate the lungs to be symmetrically aerated without evidence 
of mass, infiltrate or effusion.  The cardiomediastinal contours are unremarkable.  Osseous structure
s are intact.

 

CONCLUSION:     

1. No active disease.

 

 

 

 Jose A Calvo MD on March 18, 2018 at 20:17           

Board Certified Radiologist.

 This report was verified electronically.

## 2018-03-19 NOTE — EKG
Date Performed: 03/18/2018       Time Performed: 20:21:49

 

PTAGE:      77 years

 

EKG:      Sinus rhythm 

 

 WITH FIRST DEGREE AV BLOCK NONSPECIFIC T-WAVE ABNORMALITY When compared to previous tracing, the pat
ient is no longer Tachycardic. ABNORMAL ECG

 

PREVIOUS TRACING       : 03/18/2018 19.10

 

DOCTOR:   Alexandria Merlos  Interpretating Date/Time  03/19/2018 21:35:28

## 2018-03-19 NOTE — EKG
Date Performed: 03/18/2018       Time Performed: 19:10:04

 

PTAGE:      77 years

 

EKG:      Rapid supraventricular tachycardia most consistent with atrial flutter ST DEVIATION AND MOD
ERATE T-WAVE ABNORMALITY, CONSIDER INFERIOR ISCHEMIA Compared to previous tracing the patient now shanon
ears to be in atrial flutter with rapid v. rate ABNORMAL ECG

 

PREVIOUS TRACING       : 01/11/2011 11.28

 

DOCTOR:   Alexandria Merlos  Interpretating Date/Time  03/19/2018 10:37:22